# Patient Record
Sex: FEMALE | Race: WHITE | NOT HISPANIC OR LATINO | Employment: FULL TIME | URBAN - METROPOLITAN AREA
[De-identification: names, ages, dates, MRNs, and addresses within clinical notes are randomized per-mention and may not be internally consistent; named-entity substitution may affect disease eponyms.]

---

## 2017-04-17 ENCOUNTER — APPOINTMENT (OUTPATIENT)
Dept: WOMENS IMAGING | Facility: HOSPITAL | Age: 57
End: 2017-04-17

## 2017-04-17 PROCEDURE — 77067 SCR MAMMO BI INCL CAD: CPT | Performed by: RADIOLOGY

## 2018-04-18 ENCOUNTER — APPOINTMENT (OUTPATIENT)
Dept: WOMENS IMAGING | Facility: HOSPITAL | Age: 58
End: 2018-04-18

## 2018-04-18 PROCEDURE — 77067 SCR MAMMO BI INCL CAD: CPT | Performed by: RADIOLOGY

## 2019-04-18 ENCOUNTER — APPOINTMENT (OUTPATIENT)
Dept: WOMENS IMAGING | Facility: HOSPITAL | Age: 59
End: 2019-04-18

## 2019-04-18 PROCEDURE — 77067 SCR MAMMO BI INCL CAD: CPT | Performed by: RADIOLOGY

## 2020-10-27 ENCOUNTER — APPOINTMENT (OUTPATIENT)
Dept: PREADMISSION TESTING | Facility: HOSPITAL | Age: 60
End: 2020-10-27

## 2020-10-27 LAB — SARS-COV-2 RNA RESP QL NAA+PROBE: NOT DETECTED

## 2020-10-27 PROCEDURE — C9803 HOPD COVID-19 SPEC COLLECT: HCPCS

## 2020-10-27 PROCEDURE — U0004 COV-19 TEST NON-CDC HGH THRU: HCPCS

## 2020-10-29 ENCOUNTER — LAB REQUISITION (OUTPATIENT)
Dept: LAB | Facility: HOSPITAL | Age: 60
End: 2020-10-29

## 2020-10-29 DIAGNOSIS — R22.30 LOCALIZED SWELLING, MASS AND LUMP, UNSPECIFIED UPPER LIMB: ICD-10-CM

## 2020-10-29 PROCEDURE — 88307 TISSUE EXAM BY PATHOLOGIST: CPT | Performed by: PLASTIC SURGERY

## 2020-10-30 LAB
CYTO UR: NORMAL
LAB AP CASE REPORT: NORMAL
LAB AP CLINICAL INFORMATION: NORMAL
PATH REPORT.FINAL DX SPEC: NORMAL
PATH REPORT.GROSS SPEC: NORMAL

## 2021-04-16 ENCOUNTER — APPOINTMENT (OUTPATIENT)
Dept: WOMENS IMAGING | Facility: HOSPITAL | Age: 61
End: 2021-04-16

## 2021-04-16 PROCEDURE — 77067 SCR MAMMO BI INCL CAD: CPT | Performed by: RADIOLOGY

## 2022-03-24 ENCOUNTER — TELEPHONE (OUTPATIENT)
Dept: FAMILY MEDICINE CLINIC | Facility: CLINIC | Age: 62
End: 2022-03-24

## 2022-03-29 DIAGNOSIS — Z00.00 HEALTHCARE MAINTENANCE: ICD-10-CM

## 2022-04-02 DIAGNOSIS — Z00.00 HEALTHCARE MAINTENANCE: ICD-10-CM

## 2022-04-08 ENCOUNTER — OFFICE VISIT (OUTPATIENT)
Dept: FAMILY MEDICINE CLINIC | Facility: CLINIC | Age: 62
End: 2022-04-08

## 2022-04-08 VITALS
HEART RATE: 82 BPM | DIASTOLIC BLOOD PRESSURE: 84 MMHG | HEIGHT: 63 IN | RESPIRATION RATE: 16 BRPM | SYSTOLIC BLOOD PRESSURE: 144 MMHG | TEMPERATURE: 98 F

## 2022-04-08 DIAGNOSIS — I10 BENIGN ESSENTIAL HYPERTENSION: Primary | ICD-10-CM

## 2022-04-08 PROCEDURE — 99203 OFFICE O/P NEW LOW 30 MIN: CPT | Performed by: NURSE PRACTITIONER

## 2022-04-08 RX ORDER — LORATADINE 10 MG/1
10 TABLET ORAL
COMMUNITY
End: 2023-03-31 | Stop reason: CLARIF

## 2022-04-08 RX ORDER — TRIAMTERENE AND HYDROCHLOROTHIAZIDE 75; 50 MG/1; MG/1
1 TABLET ORAL DAILY
Qty: 90 TABLET | Refills: 1 | Status: SHIPPED | OUTPATIENT
Start: 2022-04-08 | End: 2022-10-11 | Stop reason: SDUPTHER

## 2022-04-08 RX ORDER — TRIAMTERENE AND HYDROCHLOROTHIAZIDE 75; 50 MG/1; MG/1
TABLET ORAL
COMMUNITY
Start: 2022-01-24 | End: 2022-04-08 | Stop reason: SDUPTHER

## 2022-04-08 NOTE — PROGRESS NOTES
"Chief Complaint  Depression and Hypertension    Subjective          Seema Mazariegos presents to [facility]  History of Present Illness  She is doing well on her medications. She states that her b/p readings are normal at home.  She denies chest pain and sob.   Objective   Vital Signs:   /84 (BP Location: Left arm, Patient Position: Sitting, Cuff Size: Large Adult)   Pulse 82   Temp 98 °F (36.7 °C) (Infrared)   Resp 16   Ht 160 cm (63\")     BMI has not been calculated during today's encounter.       Physical Exam  Vitals and nursing note reviewed.   Constitutional:       Appearance: Normal appearance. She is obese.   HENT:      Head: Normocephalic and atraumatic.      Right Ear: Tympanic membrane and ear canal normal.      Left Ear: Tympanic membrane and ear canal normal.      Nose: Nose normal.      Mouth/Throat:      Mouth: Mucous membranes are dry.      Pharynx: Oropharynx is clear.   Eyes:      Extraocular Movements: Extraocular movements intact.      Conjunctiva/sclera: Conjunctivae normal.      Pupils: Pupils are equal, round, and reactive to light.   Cardiovascular:      Rate and Rhythm: Normal rate and regular rhythm.      Heart sounds: Normal heart sounds.   Pulmonary:      Effort: Pulmonary effort is normal.      Breath sounds: Normal breath sounds.   Abdominal:      General: Abdomen is flat. Bowel sounds are normal. There is no distension.      Palpations: Abdomen is soft.      Tenderness: There is no abdominal tenderness. There is no guarding or rebound.   Musculoskeletal:         General: Normal range of motion.      Cervical back: Normal range of motion and neck supple.   Skin:     General: Skin is warm and dry.      Capillary Refill: Capillary refill takes less than 2 seconds.   Neurological:      General: No focal deficit present.      Mental Status: She is alert and oriented to person, place, and time. Mental status is at baseline.   Psychiatric:         Mood " and Affect: Mood normal.         Behavior: Behavior normal.         Thought Content: Thought content normal.         Judgment: Judgment normal.        Result Review :       Data reviewed: Her latest bw looks good. We reviewed this today.          Assessment and Plan    Diagnoses and all orders for this visit:    1. Benign essential hypertension (Primary)  Assessment & Plan:  Hypertension is improving with treatment.  Continue current treatment regimen.  Blood pressure will be reassessed at the next regular appointment.        Follow Up   Return in about 6 months (around 10/8/2022) for Recheck.  Patient was given instructions and counseling regarding her condition or for health maintenance advice. Please see specific information pulled into the AVS if appropriate.

## 2022-09-06 ENCOUNTER — TELEPHONE (OUTPATIENT)
Dept: FAMILY MEDICINE CLINIC | Facility: CLINIC | Age: 62
End: 2022-09-06

## 2022-09-06 DIAGNOSIS — I10 BENIGN ESSENTIAL HYPERTENSION: Primary | ICD-10-CM

## 2022-09-06 DIAGNOSIS — Z00.00 HEALTHCARE MAINTENANCE: ICD-10-CM

## 2022-09-06 DIAGNOSIS — Z11.59 ENCOUNTER FOR HCV SCREENING TEST FOR LOW RISK PATIENT: ICD-10-CM

## 2022-10-11 DIAGNOSIS — I10 BENIGN ESSENTIAL HYPERTENSION: ICD-10-CM

## 2022-10-11 RX ORDER — TRIAMTERENE AND HYDROCHLOROTHIAZIDE 75; 50 MG/1; MG/1
1 TABLET ORAL DAILY
Qty: 30 TABLET | Refills: 0 | Status: SHIPPED | OUTPATIENT
Start: 2022-10-11 | End: 2022-10-17

## 2022-10-11 NOTE — TELEPHONE ENCOUNTER
Incoming Refill Request      Medication requested (name and dose): TRIAMTERENE-HYDROCHLOROTHIAZIDE 75-50 MG     Pharmacy where request should be sent: *[facility]     Additional details provided by patient: Patient is going to run out B/P med before her 10/21. She had an appt on 10/14 but was rescheduled due to provider being unavaliable.     Best call back number: [phone]    Does the patient have less than a 3 day supply:  [] Yes  [x] No    Ney Stern Rep  10/11/22, 14:05 EDT

## 2022-10-11 NOTE — TELEPHONE ENCOUNTER
Rx Refill Note    Requested Prescriptions     Pending Prescriptions Disp Refills   • triamterene-hydrochlorothiazide (MAXZIDE) 75-50 MG per tablet 30 tablet 0     Sig: Take 1 tablet by mouth Daily.        Last office visit with prescribing clinician: 4/8/2022      Next office visit with prescribing clinician: 10/21/2022   Last labs:   Last refill: 04/08/2022   Pharmacy (be sure to add in Epic). correct

## 2022-10-15 DIAGNOSIS — I10 BENIGN ESSENTIAL HYPERTENSION: ICD-10-CM

## 2022-10-17 RX ORDER — TRIAMTERENE AND HYDROCHLOROTHIAZIDE 75; 50 MG/1; MG/1
TABLET ORAL
Qty: 30 TABLET | Refills: 0 | Status: SHIPPED | OUTPATIENT
Start: 2022-10-17 | End: 2022-10-21 | Stop reason: SDUPTHER

## 2022-10-17 NOTE — TELEPHONE ENCOUNTER
Rx Refill Note    Requested Prescriptions     Pending Prescriptions Disp Refills   • triamterene-hydrochlorothiazide (MAXZIDE) 75-50 MG per tablet [Pharmacy Med Name: TRIAMTERENE-HCTZ 75-50 MG TAB] 30 tablet 0     Sig: TAKE ONE TABLET BY MOUTH DAILY        Last office visit with prescribing clinician: 4/8/2022      Next office visit with prescribing clinician: 10/21/2022   Last labs:   Last refill: 10/11/2022   Pharmacy (be sure to add in Epic). correct

## 2022-10-21 ENCOUNTER — OFFICE VISIT (OUTPATIENT)
Dept: FAMILY MEDICINE CLINIC | Facility: CLINIC | Age: 62
End: 2022-10-21

## 2022-10-21 VITALS
BODY MASS INDEX: 45.58 KG/M2 | OXYGEN SATURATION: 98 % | SYSTOLIC BLOOD PRESSURE: 132 MMHG | DIASTOLIC BLOOD PRESSURE: 90 MMHG | HEART RATE: 88 BPM | WEIGHT: 247.7 LBS | HEIGHT: 62 IN

## 2022-10-21 DIAGNOSIS — F51.01 PRIMARY INSOMNIA: ICD-10-CM

## 2022-10-21 DIAGNOSIS — M19.90 ARTHRITIS: Primary | ICD-10-CM

## 2022-10-21 DIAGNOSIS — I10 BENIGN ESSENTIAL HYPERTENSION: ICD-10-CM

## 2022-10-21 PROCEDURE — 99214 OFFICE O/P EST MOD 30 MIN: CPT | Performed by: NURSE PRACTITIONER

## 2022-10-21 RX ORDER — TRIAMTERENE AND HYDROCHLOROTHIAZIDE 75; 50 MG/1; MG/1
1 TABLET ORAL DAILY
Qty: 90 TABLET | Refills: 1 | Status: SHIPPED | OUTPATIENT
Start: 2022-10-21 | End: 2023-03-31 | Stop reason: SDUPTHER

## 2022-10-21 RX ORDER — CEPHALEXIN 500 MG/1
CAPSULE ORAL
COMMUNITY
Start: 2022-09-22 | End: 2022-10-21

## 2022-10-21 RX ORDER — NIRMATRELVIR AND RITONAVIR 300-100 MG
KIT ORAL
COMMUNITY
Start: 2022-08-19 | End: 2022-10-21

## 2022-10-21 NOTE — ASSESSMENT & PLAN NOTE
I recommend she start with over-the-counter melatonin.  She can push the dose up to 9 mg on this.  She is to follow-up in a couple of months if this solution does not work for her.  Also encouraged her to read on relaxation techniques to help her get ready for bed at night.

## 2022-10-21 NOTE — PROGRESS NOTES
"Chief Complaint  Insomnia and Arthritis    Subjective        Seema Mazariegos presents to [facility]  History of Present Illness  She has a lot of pain in her feet and hands. She has a lot arthritis and has had bilateral knee and hip replacements. She has recently bw scanned into her chart.  She has not been tested for RA.  #2 insomnia she does not take anything over-the-counter for this.  She has suffered for this for a few years.  She is postmenopausal.    Objective   Vital Signs:  /90   Pulse 88   Ht 157.5 cm (62\")   Wt 112 kg (247 lb 11.2 oz)   SpO2 98%   BMI 45.30 kg/m²   Estimated body mass index is 45.3 kg/m² as calculated from the following:    Height as of this encounter: 157.5 cm (62\").    Weight as of this encounter: 112 kg (247 lb 11.2 oz).          Physical Exam  Vitals and nursing note reviewed.   Constitutional:       Appearance: Normal appearance.   HENT:      Head: Normocephalic and atraumatic.      Right Ear: Tympanic membrane and ear canal normal.      Left Ear: Tympanic membrane and ear canal normal.      Nose: Nose normal.      Mouth/Throat:      Pharynx: Oropharynx is clear.   Eyes:      Extraocular Movements: Extraocular movements intact.      Conjunctiva/sclera: Conjunctivae normal.      Pupils: Pupils are equal, round, and reactive to light.   Cardiovascular:      Rate and Rhythm: Normal rate and regular rhythm.      Heart sounds: Normal heart sounds.   Pulmonary:      Effort: Pulmonary effort is normal.      Breath sounds: Normal breath sounds.   Abdominal:      General: Abdomen is flat. Bowel sounds are normal. There is no distension.      Palpations: Abdomen is soft.      Tenderness: There is no abdominal tenderness. There is no guarding or rebound.   Musculoskeletal:         General: Normal range of motion.      Cervical back: Normal range of motion and neck supple.   Skin:     General: Skin is warm and dry.   Neurological:      General: No focal " deficit present.      Mental Status: She is alert and oriented to person, place, and time. Mental status is at baseline.   Psychiatric:         Mood and Affect: Mood normal.         Behavior: Behavior normal.         Thought Content: Thought content normal.         Judgment: Judgment normal.        Result Review :                Assessment and Plan   Diagnoses and all orders for this visit:    1. Arthritis (Primary)  Assessment & Plan:  Will do inflammatory markers and see her back.  She is to f/u with her orthopedic surgeon.       2. Benign essential hypertension  Assessment & Plan:  Hypertension is improving with treatment.  Continue current treatment regimen.  Blood pressure will be reassessed at the next regular appointment.    Orders:  -     triamterene-hydrochlorothiazide (MAXZIDE) 75-50 MG per tablet; Take 1 tablet by mouth Daily.  Dispense: 90 tablet; Refill: 1    3. Primary insomnia  Assessment & Plan:  I recommend she start with over-the-counter melatonin.  She can push the dose up to 9 mg on this.  She is to follow-up in a couple of months if this solution does not work for her.  Also encouraged her to read on relaxation techniques to help her get ready for bed at night.             Follow Up   Return in about 6 months (around 4/21/2023) for Recheck.  Patient was given instructions and counseling regarding her condition or for health maintenance advice. Please see specific information pulled into the AVS if appropriate.       Answers for HPI/ROS submitted by the patient on 10/20/2022  Please describe your symptoms.: 6-month meds check , Also want to discuss arthritis and sleep issues.  Have you had these symptoms before?: Yes  How long have you been having these symptoms?: Greater than 2 weeks  Please list any medications you are currently taking for this condition.: Triamterene HCTZ-50, Turmeric and CBD, Melatonin  What is the primary reason for your visit?: Other

## 2023-01-10 ENCOUNTER — TRANSCRIBE ORDERS (OUTPATIENT)
Dept: CT IMAGING | Facility: CLINIC | Age: 63
End: 2023-01-10
Payer: COMMERCIAL

## 2023-01-10 DIAGNOSIS — Z12.31 ENCOUNTER FOR MAMMOGRAM TO ESTABLISH BASELINE MAMMOGRAM: Primary | ICD-10-CM

## 2023-02-15 ENCOUNTER — PATIENT MESSAGE (OUTPATIENT)
Dept: FAMILY MEDICINE CLINIC | Facility: CLINIC | Age: 63
End: 2023-02-15
Payer: COMMERCIAL

## 2023-02-15 DIAGNOSIS — R92.8 ABNORMAL MAMMOGRAM OF RIGHT BREAST: Primary | ICD-10-CM

## 2023-02-23 ENCOUNTER — TELEPHONE (OUTPATIENT)
Dept: FAMILY MEDICINE CLINIC | Facility: CLINIC | Age: 63
End: 2023-02-23
Payer: COMMERCIAL

## 2023-02-23 NOTE — TELEPHONE ENCOUNTER
Called her today. She has not had an abnormal mammogram. Requested a diagnostic mammogram. We discussed her concerned.   jimmy

## 2023-03-23 DIAGNOSIS — R92.8 ABNORMAL MAMMOGRAM OF RIGHT BREAST: ICD-10-CM

## 2023-03-31 ENCOUNTER — OFFICE VISIT (OUTPATIENT)
Dept: FAMILY MEDICINE CLINIC | Facility: CLINIC | Age: 63
End: 2023-03-31
Payer: COMMERCIAL

## 2023-03-31 VITALS
OXYGEN SATURATION: 97 % | HEART RATE: 92 BPM | WEIGHT: 252.2 LBS | SYSTOLIC BLOOD PRESSURE: 164 MMHG | BODY MASS INDEX: 46.41 KG/M2 | HEIGHT: 62 IN | DIASTOLIC BLOOD PRESSURE: 88 MMHG | TEMPERATURE: 98.2 F

## 2023-03-31 DIAGNOSIS — I10 BENIGN ESSENTIAL HYPERTENSION: ICD-10-CM

## 2023-03-31 PROCEDURE — 99213 OFFICE O/P EST LOW 20 MIN: CPT | Performed by: NURSE PRACTITIONER

## 2023-03-31 RX ORDER — CETIRIZINE HYDROCHLORIDE 10 MG/1
TABLET ORAL
COMMUNITY
Start: 2023-03-20

## 2023-03-31 RX ORDER — TRIAMTERENE AND HYDROCHLOROTHIAZIDE 75; 50 MG/1; MG/1
1 TABLET ORAL DAILY
Qty: 90 TABLET | Refills: 1 | Status: SHIPPED | OUTPATIENT
Start: 2023-03-31

## 2023-03-31 NOTE — PROGRESS NOTES
"Chief Complaint  Med Refill    Subjective        Seema Mazariegos presents to [facility]  History of Present Illness    She is here for medication refills. She is stable on her medications. She had her blood work done at work. It is scanned in her chart. 3/9/23. No chest pain, tightness and no swelling of the lower extremities. She had a normal breast u/s after having an abnormal mammogram.   Objective   Vital Signs:  /88 (BP Location: Left arm, Patient Position: Sitting, Cuff Size: Large Adult)   Pulse 92   Temp 98.2 °F (36.8 °C) (Temporal)   Ht 157.5 cm (62\")   Wt 114 kg (252 lb 3.2 oz)   SpO2 97%   BMI 46.13 kg/m²   Estimated body mass index is 46.13 kg/m² as calculated from the following:    Height as of this encounter: 157.5 cm (62\").    Weight as of this encounter: 114 kg (252 lb 3.2 oz).             Physical Exam  Vitals and nursing note reviewed.   Constitutional:       Appearance: Normal appearance.   HENT:      Head: Normocephalic and atraumatic.      Right Ear: Tympanic membrane and ear canal normal.      Left Ear: Tympanic membrane and ear canal normal.      Nose: Nose normal.      Mouth/Throat:      Pharynx: Oropharynx is clear.   Eyes:      Extraocular Movements: Extraocular movements intact.      Conjunctiva/sclera: Conjunctivae normal.      Pupils: Pupils are equal, round, and reactive to light.   Cardiovascular:      Rate and Rhythm: Normal rate and regular rhythm.      Heart sounds: Normal heart sounds.   Pulmonary:      Effort: Pulmonary effort is normal.      Breath sounds: Normal breath sounds.   Abdominal:      General: Abdomen is flat. Bowel sounds are normal. There is no distension.      Palpations: Abdomen is soft.      Tenderness: There is no abdominal tenderness. There is no guarding or rebound.   Musculoskeletal:         General: Normal range of motion.      Cervical back: Normal range of motion and neck supple.   Skin:     General: Skin is warm " and dry.   Neurological:      General: No focal deficit present.      Mental Status: She is alert and oriented to person, place, and time. Mental status is at baseline.   Psychiatric:         Mood and Affect: Mood normal.         Behavior: Behavior normal.         Thought Content: Thought content normal.         Judgment: Judgment normal.        Result Review :                   Assessment and Plan   Diagnoses and all orders for this visit:    1. Benign essential hypertension  Assessment & Plan:  Hypertension is improving with treatment.  Continue current treatment regimen.  Dietary sodium restriction.  Weight loss.  Regular aerobic exercise.  Blood pressure will be reassessed at the next regular appointment.    Orders:  -     triamterene-hydrochlorothiazide (MAXZIDE) 75-50 MG per tablet; Take 1 tablet by mouth Daily.  Dispense: 90 tablet; Refill: 1           Follow Up   Return in about 6 months (around 9/30/2023) for Recheck.  Patient was given instructions and counseling regarding her condition or for health maintenance advice. Please see specific information pulled into the AVS if appropriate.       Answers for HPI/ROS submitted by the patient on 3/24/2023  Please describe your symptoms.: None  Have you had these symptoms before?: Yes  How long have you been having these symptoms?: 1-4 days  Please describe any probable cause for these symptoms. : The “required” fields on this check in are absolutely unnecessary for my the purpose of my visit!  I provided the reason for my visit when I scheduled my appointment, a 6-month meds check.  What is the primary reason for your visit?: Other

## 2023-09-29 ENCOUNTER — OFFICE VISIT (OUTPATIENT)
Dept: FAMILY MEDICINE CLINIC | Facility: CLINIC | Age: 63
End: 2023-09-29
Payer: COMMERCIAL

## 2023-09-29 VITALS
BODY MASS INDEX: 49.04 KG/M2 | HEART RATE: 85 BPM | OXYGEN SATURATION: 97 % | WEIGHT: 266.5 LBS | HEIGHT: 62 IN | DIASTOLIC BLOOD PRESSURE: 102 MMHG | SYSTOLIC BLOOD PRESSURE: 148 MMHG | TEMPERATURE: 98.4 F

## 2023-09-29 DIAGNOSIS — E66.01 MORBID (SEVERE) OBESITY DUE TO EXCESS CALORIES: ICD-10-CM

## 2023-09-29 DIAGNOSIS — Z13.820 SCREENING FOR OSTEOPOROSIS: ICD-10-CM

## 2023-09-29 DIAGNOSIS — I10 BENIGN ESSENTIAL HYPERTENSION: Primary | ICD-10-CM

## 2023-09-29 RX ORDER — SEMAGLUTIDE 0.25 MG/.5ML
0.25 INJECTION, SOLUTION SUBCUTANEOUS WEEKLY
Qty: 6 ML | Refills: 3 | Status: SHIPPED | OUTPATIENT
Start: 2023-09-29

## 2023-09-29 RX ORDER — TRIAMTERENE AND HYDROCHLOROTHIAZIDE 75; 50 MG/1; MG/1
1 TABLET ORAL DAILY
Qty: 90 TABLET | Refills: 1 | Status: SHIPPED | OUTPATIENT
Start: 2023-09-29

## 2023-09-29 NOTE — ASSESSMENT & PLAN NOTE
Patient's (Body mass index is 48.74 kg/m².) indicates that they are morbidly/severely obese (BMI > 40 or > 35 with obesity - related health condition) with health conditions that include hypertension . Weight is newly identified. BMI  is above average; BMI management plan is completed. We discussed low calorie, low carb based diet program, portion control, increasing exercise, joining a fitness center or start home based exercise program, Weight Watchers or other Commercial based weight reduction program, and pharmacologic options including weygovy . Discussed benefits as well as side effects.

## 2023-09-29 NOTE — PROGRESS NOTES
"Chief Complaint  Pre-op Exam (L SCOTT revision)    Subjective        Seema Mazariegos presents to [facility]  History of Present Illness she is here today for a refill on her HTN.   #2 pre -op physical for total hip revision. She has recent blood work in her chart it is perfectly normal.   #3 Obesity, she is wanting to discuss weight loss drug.     Objective   Vital Signs:  BP (!) 148/102 (BP Location: Left arm, Patient Position: Sitting, Cuff Size: Large Adult)   Pulse 85   Temp 98.4 °F (36.9 °C) (Temporal)   Ht 157.5 cm (62\")   Wt 121 kg (266 lb 8 oz)   SpO2 97%   BMI 48.74 kg/m²   Estimated body mass index is 48.74 kg/m² as calculated from the following:    Height as of this encounter: 157.5 cm (62\").    Weight as of this encounter: 121 kg (266 lb 8 oz).               Physical Exam  Vitals and nursing note reviewed.   Constitutional:       Appearance: She is obese.   HENT:      Head: Normocephalic and atraumatic.      Right Ear: Tympanic membrane and ear canal normal.      Left Ear: Tympanic membrane and ear canal normal.      Nose: Nose normal.      Mouth/Throat:      Pharynx: Oropharynx is clear.   Eyes:      Extraocular Movements: Extraocular movements intact.      Conjunctiva/sclera: Conjunctivae normal.      Pupils: Pupils are equal, round, and reactive to light.   Cardiovascular:      Rate and Rhythm: Normal rate and regular rhythm.      Heart sounds: Normal heart sounds.   Pulmonary:      Effort: Pulmonary effort is normal.      Breath sounds: Normal breath sounds.   Abdominal:      General: Abdomen is flat. Bowel sounds are normal. There is no distension.      Palpations: Abdomen is soft.      Tenderness: There is no abdominal tenderness. There is no guarding or rebound.   Musculoskeletal:         General: Normal range of motion.      Cervical back: Normal range of motion and neck supple.   Skin:     General: Skin is warm and dry.   Neurological:      General: No focal " deficit present.      Mental Status: She is alert and oriented to person, place, and time. Mental status is at baseline.   Psychiatric:         Mood and Affect: Mood normal.         Behavior: Behavior normal.         Thought Content: Thought content normal.         Judgment: Judgment normal.      Result Review :      Data reviewed : reviewed recent blood work             Assessment and Plan   Diagnoses and all orders for this visit:    1. Benign essential hypertension (Primary)  Assessment & Plan:  Hypertension is improving with treatment.  Continue current treatment regimen.  Dietary sodium restriction.  Weight loss.  Regular aerobic exercise.  Blood pressure will be reassessed at the next regular appointment.    Orders:  -     triamterene-hydrochlorothiazide (MAXZIDE) 75-50 MG per tablet; Take 1 tablet by mouth Daily.  Dispense: 90 tablet; Refill: 1    2. Morbid (severe) obesity due to excess calories  Assessment & Plan:  Patient's (Body mass index is 48.74 kg/m².) indicates that they are morbidly/severely obese (BMI > 40 or > 35 with obesity - related health condition) with health conditions that include hypertension . Weight is newly identified. BMI  is above average; BMI management plan is completed. We discussed low calorie, low carb based diet program, portion control, increasing exercise, joining a fitness center or start home based exercise program, Weight Watchers or other Commercial based weight reduction program, and pharmacologic options including weygovy . Discussed benefits as well as side effects.     Orders:  -     Semaglutide-Weight Management (Wegovy) 0.25 MG/0.5ML solution auto-injector; Inject 0.25 mg under the skin into the appropriate area as directed 1 (One) Time Per Week.  Dispense: 6 mL; Refill: 3    3. Screening for osteoporosis  Assessment & Plan:  Dexa scan ordered.    Orders:  -     DEXA Bone Density Axial             Follow Up   Return in about 6 months (around 3/29/2024) for  Recheck.  Patient was given instructions and counseling regarding her condition or for health maintenance advice. Please see specific information pulled into the AVS if appropriate.

## 2023-12-05 ENCOUNTER — TRANSCRIBE ORDERS (OUTPATIENT)
Dept: HOME HEALTH SERVICES | Facility: HOME HEALTHCARE | Age: 63
End: 2023-12-05
Payer: COMMERCIAL

## 2023-12-05 ENCOUNTER — HOME HEALTH ADMISSION (OUTPATIENT)
Dept: HOME HEALTH SERVICES | Facility: HOME HEALTHCARE | Age: 63
End: 2023-12-05
Payer: COMMERCIAL

## 2023-12-05 DIAGNOSIS — T84.52XA INFECTION ASSOCIATED WITH INTERNAL LEFT HIP PROSTHESIS, INITIAL ENCOUNTER: Primary | ICD-10-CM

## 2024-03-08 ENCOUNTER — TRANSCRIBE ORDERS (OUTPATIENT)
Dept: MAMMOGRAPHY | Facility: CLINIC | Age: 64
End: 2024-03-08
Payer: COMMERCIAL

## 2024-03-08 DIAGNOSIS — Z12.31 ENCOUNTER FOR SCREENING MAMMOGRAM FOR MALIGNANT NEOPLASM OF BREAST: Primary | ICD-10-CM

## 2024-03-19 DIAGNOSIS — Z13.1 SCREENING FOR DIABETES MELLITUS: ICD-10-CM

## 2024-03-19 DIAGNOSIS — Z13.220 SCREENING FOR HYPERLIPIDEMIA: ICD-10-CM

## 2024-03-19 DIAGNOSIS — Z00.00 GENERAL MEDICAL EXAM: Primary | ICD-10-CM

## 2024-03-19 DIAGNOSIS — Z11.59 NEED FOR HEPATITIS C SCREENING TEST: ICD-10-CM

## 2024-04-12 ENCOUNTER — OFFICE VISIT (OUTPATIENT)
Dept: FAMILY MEDICINE CLINIC | Facility: CLINIC | Age: 64
End: 2024-04-12
Payer: COMMERCIAL

## 2024-04-12 VITALS
OXYGEN SATURATION: 98 % | WEIGHT: 265.2 LBS | HEIGHT: 62 IN | DIASTOLIC BLOOD PRESSURE: 90 MMHG | SYSTOLIC BLOOD PRESSURE: 170 MMHG | BODY MASS INDEX: 48.8 KG/M2 | RESPIRATION RATE: 18 BRPM | HEART RATE: 106 BPM

## 2024-04-12 DIAGNOSIS — E87.6 HYPOKALEMIA: ICD-10-CM

## 2024-04-12 DIAGNOSIS — I10 BENIGN ESSENTIAL HYPERTENSION: ICD-10-CM

## 2024-04-12 DIAGNOSIS — I10 PRIMARY HYPERTENSION: Primary | ICD-10-CM

## 2024-04-12 DIAGNOSIS — R73.03 PREDIABETES: ICD-10-CM

## 2024-04-12 RX ORDER — TRIAMTERENE AND HYDROCHLOROTHIAZIDE 75; 50 MG/1; MG/1
1 TABLET ORAL DAILY
Qty: 90 TABLET | Refills: 0 | Status: SHIPPED | OUTPATIENT
Start: 2024-04-12

## 2024-04-12 RX ORDER — LOSARTAN POTASSIUM 50 MG/1
50 TABLET ORAL DAILY
Qty: 90 TABLET | Refills: 0 | Status: SHIPPED | OUTPATIENT
Start: 2024-04-12

## 2024-04-12 NOTE — ASSESSMENT & PLAN NOTE
Blood pressure is not well-controlled, add losartan and follow-up in 10 weeks.  She has her labs done through a clinic at work through [employer].  Written order given to recheck a BMP and magnesium level in 2 to 3 weeks.  If potassium not normalized with adding losartan, she will need potassium supplement.   28-Feb-2024

## 2024-04-12 NOTE — PROGRESS NOTES
Patient Office Visit      Patient Name: Seema Mazariegos  : 1960   MRN: [id number]     Chief Complaint:    Chief Complaint   Patient presents with    Hypertension    low potassium       History of Present Illness: Seema Mazariegos is a 63 y.o. female who is here today to establish care with a new provider in the office.  She needs refills of her blood pressure medication.  Patient had a revision of hip replacement and ended up with a staph infection.  She ended up back in the hospital with another surgical procedure and prolonged course of antibiotics for the infection.  She is confused as to why her potassium has been running low the past couple of times checked as this has never been an issue in the past.  She denies any diarrhea or excessive fluid intake.  She has been on an antibiotic but no other change in medication.    Subjective      Review of Systems:         Past Medical History:   Past Medical History:   Diagnosis Date    Benign essential hypertension     Depressive disorder     Hip osteoarthritis     RIGHT - HAD SOME NERVER DAMAGE BEING TREATED WITH GABAPENTIN       Past Surgical History:   Past Surgical History:   Procedure Laterality Date     SECTION      HYSTERECTOMY      JOINT REPLACEMENT Bilateral     Knees    TONSILLECTOMY      TOTAL HIP ARTHROPLASTY Bilateral     POSTERIOR APPROACH/ Revision       Family History:   Family History   Family history unknown: Yes       Social History:   Social History     Socioeconomic History    Marital status:      Spouse name: Asa   Tobacco Use    Smoking status: Never     Passive exposure: Never    Smokeless tobacco: Never   Vaping Use    Vaping status: Never Used   Substance and Sexual Activity    Alcohol use: Not Currently    Drug use: Never    Sexual activity: Yes     Partners: Male       Allergies:   Allergies   Allergen Reactions    Penicillins Rash    Sulfa Antibiotics Rash       Objective     Physical Exam:  Vital Signs:   Vitals:  "   04/12/24 1300   BP: 170/90   BP Location: Left arm   Patient Position: Sitting   Cuff Size: Large Adult   Pulse: 106   Resp: 18   SpO2: 98%   Weight: 120 kg (265 lb 3.2 oz)   Height: 157.5 cm (62\")     Body mass index is 48.51 kg/m².           Physical Exam  Constitutional:       Appearance: Normal appearance. She is obese.   Cardiovascular:      Rate and Rhythm: Normal rate and regular rhythm.   Pulmonary:      Effort: Pulmonary effort is normal.      Breath sounds: Normal breath sounds.   Musculoskeletal:      Cervical back: Normal range of motion and neck supple.   Neurological:      Mental Status: She is alert and oriented to person, place, and time.   Psychiatric:         Mood and Affect: Mood normal.         Behavior: Behavior normal.         Thought Content: Thought content normal.         Judgment: Judgment normal.         Procedures    Assessment / Plan      Assessment/Plan:   Diagnoses and all orders for this visit:    1. Primary hypertension (Primary)  Assessment & Plan:  Blood pressure is not well-controlled, add losartan and follow-up in 10 weeks.  She has her labs done through a clinic at work through [email or web address] [facility].  Written order given to recheck a BMP and magnesium level in 2 to 3 weeks.  If potassium not normalized with adding losartan, she will need potassium supplement.    Orders:  -     losartan (COZAAR) 50 MG tablet; Take 1 tablet by mouth Daily.  Dispense: 90 tablet; Refill: 0  -     triamterene-hydrochlorothiazide (MAXZIDE) 75-50 MG per tablet; Take 1 tablet by mouth Daily.  Dispense: 90 tablet; Refill: 0    2. Benign essential hypertension  Assessment & Plan:  Blood pressure is not well-controlled, add losartan and follow-up in 10 weeks.  She has her labs done through a clinic at work through [employer].  Written order given to recheck a BMP and magnesium level in 2 to 3 weeks.  If potassium not normalized with adding losartan, she will need potassium supplement.      3. " Prediabetes  Assessment & Plan:  Will check an A1c when she has her labs done in 2 to 3 weeks.    Orders:  -     Hemoglobin A1c; Future    4. Hypokalemia  Assessment & Plan:  Repeat BMP in 2 to 3 weeks, add supplement if potassium still low after adding losartan.    Orders:  -     Basic Metabolic Panel; Future  -     Magnesium; Future           Medications:     Current Outpatient Medications:     cetirizine (zyrTEC) 10 MG tablet, , Disp: , Rfl:     triamterene-hydrochlorothiazide (MAXZIDE) 75-50 MG per tablet, Take 1 tablet by mouth Daily., Disp: 90 tablet, Rfl: 0    losartan (COZAAR) 50 MG tablet, Take 1 tablet by mouth Daily., Disp: 90 tablet, Rfl: 0    I spent 55 minutes caring for Seema on this date of service. This time includes time spent by me in the following activities:preparing for the visit, reviewing tests, obtaining and/or reviewing a separately obtained history, performing a medically appropriate examination and/or evaluation , counseling and educating the patient/family/caregiver, ordering medications, tests, or procedures, and documenting information in the medical record    Follow Up:   Return in about 2 months (around 6/12/2024) for 30 minute med recheck.    Yulissa Hawley PA-C   [facility]     NOTE TO PATIENT: The 21st Century Cures Act makes medical notes like these available to patients in the interest of transparency. However, be advised this is a medical document. It is intended as peer to peer communication. It is written in medical language and may contain abbreviations or verbiage that are unfamiliar. It may appear blunt or direct. Medical documents are intended to carry relevant information, facts as evident, and the clinical opinion of the practitioner.

## 2024-05-09 DIAGNOSIS — I10 PRIMARY HYPERTENSION: ICD-10-CM

## 2024-05-09 RX ORDER — TRIAMTERENE AND HYDROCHLOROTHIAZIDE 75; 50 MG/1; MG/1
1 TABLET ORAL DAILY
Qty: 90 TABLET | Refills: 0 | OUTPATIENT
Start: 2024-05-09

## 2024-06-21 ENCOUNTER — OFFICE VISIT (OUTPATIENT)
Dept: FAMILY MEDICINE CLINIC | Facility: CLINIC | Age: 64
End: 2024-06-21
Payer: COMMERCIAL

## 2024-06-21 VITALS
SYSTOLIC BLOOD PRESSURE: 136 MMHG | BODY MASS INDEX: 48.76 KG/M2 | WEIGHT: 265 LBS | RESPIRATION RATE: 15 BRPM | DIASTOLIC BLOOD PRESSURE: 82 MMHG | HEART RATE: 73 BPM | OXYGEN SATURATION: 98 % | HEIGHT: 62 IN

## 2024-06-21 DIAGNOSIS — Z13.220 SCREENING FOR HYPERLIPIDEMIA: ICD-10-CM

## 2024-06-21 DIAGNOSIS — T84.52XS INFECTION ASSOCIATED WITH INTERNAL LEFT HIP PROSTHESIS, SEQUELA: ICD-10-CM

## 2024-06-21 DIAGNOSIS — E87.6 HYPOKALEMIA: ICD-10-CM

## 2024-06-21 DIAGNOSIS — Z00.00 GENERAL MEDICAL EXAM: ICD-10-CM

## 2024-06-21 DIAGNOSIS — I10 PRIMARY HYPERTENSION: Primary | ICD-10-CM

## 2024-06-21 DIAGNOSIS — R73.03 PREDIABETES: ICD-10-CM

## 2024-06-21 PROBLEM — T84.52XA: Status: ACTIVE | Noted: 2024-06-21

## 2024-06-21 PROCEDURE — 99215 OFFICE O/P EST HI 40 MIN: CPT | Performed by: PHYSICIAN ASSISTANT

## 2024-06-21 RX ORDER — CEPHALEXIN 500 MG/1
500 CAPSULE ORAL 2 TIMES DAILY
COMMUNITY

## 2024-06-21 RX ORDER — LOSARTAN POTASSIUM 50 MG/1
50 TABLET ORAL DAILY
Qty: 90 TABLET | Refills: 1 | Status: SHIPPED | OUTPATIENT
Start: 2024-06-21

## 2024-06-21 RX ORDER — TRIAMTERENE AND HYDROCHLOROTHIAZIDE 75; 50 MG/1; MG/1
1 TABLET ORAL DAILY
Qty: 90 TABLET | Refills: 1 | Status: SHIPPED | OUTPATIENT
Start: 2024-06-21

## 2024-06-21 NOTE — ASSESSMENT & PLAN NOTE
Previous potassium was 3.2.  We changed her hydrochlorothiazide to triamterene hydrochlorothiazide.  Potassium up to 3.6.  Will continue to monitor, blood pressure looks good.

## 2024-06-21 NOTE — ASSESSMENT & PLAN NOTE
Patient may be on Keflex the rest of her life.  Infectious disease doctor is following inflammatory markers and patient is requesting CRP and sed rate to be done with her routine blood work every 6 months.  Patient is able to get her lab work done at [facility] through her employer at no cost to her.

## 2024-06-21 NOTE — PROGRESS NOTES
"      Patient Office Visit      Patient Name: Seema Mazariegos  : 1960   MRN: [id number]     Chief Complaint:    Chief Complaint   Patient presents with    Hypertension       History of Present Illness: Seema Mazariegos is a 64 y.o. female who is here today for follow-up on her hypertension and low potassium.    Subjective      Review of Systems:         Past Medical History:   Past Medical History:   Diagnosis Date    Allergic 1968    Benign essential hypertension     Depressive disorder     Hip osteoarthritis     RIGHT - HAD SOME NERVER DAMAGE BEING TREATED WITH GABAPENTIN       Past Surgical History:   Past Surgical History:   Procedure Laterality Date     SECTION      COLONOSCOPY      HYSTERECTOMY      JOINT REPLACEMENT Bilateral     Knees    SUBTOTAL HYSTERECTOMY  2004    TONSILLECTOMY      TOTAL HIP ARTHROPLASTY Bilateral     POSTERIOR APPROACH/ Revision       Family History:   Family History   Problem Relation Age of Onset    Arthritis Mother     Diabetes Mother     Hyperlipidemia Mother     Vision loss Mother        Social History:   Social History     Socioeconomic History    Marital status:      Spouse name: Asa   Tobacco Use    Smoking status: Never     Passive exposure: Never    Smokeless tobacco: Never   Vaping Use    Vaping status: Never Used   Substance and Sexual Activity    Alcohol use: Not Currently    Drug use: Never    Sexual activity: Yes     Partners: Male     Birth control/protection: Vasectomy, Hysterectomy       Allergies:   Allergies   Allergen Reactions    Penicillins Rash    Sulfa Antibiotics Rash       Objective     Physical Exam:  Vital Signs:   Vitals:    24 1355   BP: 136/82   BP Location: Right arm   Patient Position: Sitting   Cuff Size: Adult   Pulse: 73   Resp: 15   SpO2: 98%   Weight: 120 kg (265 lb)   Height: 157.5 cm (62\")     Body mass index is 48.47 kg/m².           Physical Exam  Constitutional:       General: She is not in acute distress.     " Appearance: Normal appearance.   Neurological:      Mental Status: She is alert.   Psychiatric:         Mood and Affect: Mood normal.         Behavior: Behavior normal.         Thought Content: Thought content normal.         Judgment: Judgment normal.         Procedures    Assessment / Plan      Assessment/Plan:   Diagnoses and all orders for this visit:    1. Primary hypertension (Primary)  Assessment & Plan:  Previous potassium was 3.2.  We changed her hydrochlorothiazide to triamterene hydrochlorothiazide.  Potassium up to 3.6.  Will continue to monitor, blood pressure looks good.    Orders:  -     losartan (COZAAR) 50 MG tablet; Take 1 tablet by mouth Daily.  Dispense: 90 tablet; Refill: 1  -     triamterene-hydrochlorothiazide (MAXZIDE) 75-50 MG per tablet; Take 1 tablet by mouth Daily.  Dispense: 90 tablet; Refill: 1    2. 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  Infectious disease doctor is following inflammatory markers and patient is requesting CRP and sed rate to be done with her routine blood work every 6 months.  Patient is able to get her lab work done at [facility] through her employer at no cost to her.    Orders:  -     CK; Future  -     C-reactive protein; Future  -     Sedimentation rate, automated; Future    3. Prediabetes  Assessment & Plan:  A1c still in the prediabetes range at 5.8.  We will continue to monitor.    Orders:  -     Hemoglobin A1c; Future    4. Screening for hyperlipidemia  -     Lipid Panel; Future    5. General medical exam  -     Comprehensive Metabolic Panel; Future  -     Vitamin B12; Future  -     Folate; Future  -     TSH; Future  -     T4, Free; Future  -     CBC Auto Differential; Future  -     Vitamin D,25-Hydroxy; Future    6. Hypokalemia  Assessment & Plan:  Stable, continue with potassium sparing diuretic and continue to monitor.    Orders:  -     Comprehensive Metabolic Panel;  Future           Medications:     Current Outpatient Medications:     cetirizine (zyrTEC) 10 MG tablet, , Disp: , Rfl:     losartan (COZAAR) 50 MG tablet, Take 1 tablet by mouth Daily., Disp: 90 tablet, Rfl: 1    triamterene-hydrochlorothiazide (MAXZIDE) 75-50 MG per tablet, Take 1 tablet by mouth Daily., Disp: 90 tablet, Rfl: 1    cephalexin (KEFLEX) 500 MG capsule, Take 1 capsule by mouth 2 (Two) Times a Day. Indications: Bone and/or Joint Infection, Disp: , Rfl:     I spent 40 minutes caring for Seema on this date of service. This time includes time spent by me in the following activities:preparing for the visit, reviewing tests, obtaining and/or reviewing a separately obtained history, performing a medically appropriate examination and/or evaluation , counseling and educating the patient/family/caregiver, ordering medications, tests, or procedures, and documenting information in the medical record    Follow Up:   Return in about 4 months (around 10/21/2024) for Annual physical.    Yulissa Hawley PA-C   [facility]     NOTE TO PATIENT: The 21st Century Cures Act makes medical notes like these available to patients in the interest of transparency. However, be advised this is a medical document. It is intended as peer to peer communication. It is written in medical language and may contain abbreviations or verbiage that are unfamiliar. It may appear blunt or direct. Medical documents are intended to carry relevant information, facts as evident, and the clinical opinion of the practitioner.       Answers submitted by the patient for this visit:  Other (Submitted on 6/14/2024)  Please describe your symptoms.: Follow up requested by Yulissa Hawley after she added a new BP medication in April.  Have you had these symptoms before?: No  How long have you been having these symptoms?: 1-4 days  Please list any medications you are currently taking for this condition.: See prescribed BP  medication.  Primary Reason for Visit (Submitted on 6/14/2024)  What is the primary reason for your visit?: Other

## 2024-07-05 DIAGNOSIS — I10 PRIMARY HYPERTENSION: ICD-10-CM

## 2024-07-05 RX ORDER — LOSARTAN POTASSIUM 50 MG/1
50 TABLET ORAL DAILY
Qty: 90 TABLET | Refills: 1 | Status: SHIPPED | OUTPATIENT
Start: 2024-07-05

## 2024-10-04 ENCOUNTER — OFFICE VISIT (OUTPATIENT)
Dept: FAMILY MEDICINE CLINIC | Facility: CLINIC | Age: 64
End: 2024-10-04
Payer: COMMERCIAL

## 2024-10-04 VITALS
SYSTOLIC BLOOD PRESSURE: 132 MMHG | OXYGEN SATURATION: 98 % | DIASTOLIC BLOOD PRESSURE: 80 MMHG | BODY MASS INDEX: 47.48 KG/M2 | RESPIRATION RATE: 15 BRPM | WEIGHT: 258 LBS | HEART RATE: 79 BPM | HEIGHT: 62 IN

## 2024-10-04 DIAGNOSIS — E66.01 CLASS 3 SEVERE OBESITY DUE TO EXCESS CALORIES WITH SERIOUS COMORBIDITY AND BODY MASS INDEX (BMI) OF 45.0 TO 49.9 IN ADULT: ICD-10-CM

## 2024-10-04 DIAGNOSIS — Z12.4 SCREENING FOR CERVICAL CANCER: ICD-10-CM

## 2024-10-04 DIAGNOSIS — I10 PRIMARY HYPERTENSION: ICD-10-CM

## 2024-10-04 DIAGNOSIS — Z00.00 GENERAL MEDICAL EXAM: Primary | ICD-10-CM

## 2024-10-04 DIAGNOSIS — E87.6 HYPOKALEMIA: ICD-10-CM

## 2024-10-04 DIAGNOSIS — R73.03 PREDIABETES: ICD-10-CM

## 2024-10-04 DIAGNOSIS — E66.813 CLASS 3 SEVERE OBESITY DUE TO EXCESS CALORIES WITH SERIOUS COMORBIDITY AND BODY MASS INDEX (BMI) OF 45.0 TO 49.9 IN ADULT: ICD-10-CM

## 2024-10-04 RX ORDER — TRIAMTERENE AND HYDROCHLOROTHIAZIDE 75; 50 MG/1; MG/1
1 TABLET ORAL DAILY
Qty: 90 TABLET | Refills: 3 | Status: SHIPPED | OUTPATIENT
Start: 2024-10-04

## 2024-10-04 RX ORDER — LOSARTAN POTASSIUM 50 MG/1
50 TABLET ORAL DAILY
Qty: 90 TABLET | Refills: 3 | Status: SHIPPED | OUTPATIENT
Start: 2024-10-04

## 2024-10-04 NOTE — PROGRESS NOTES
Annual Physical-Preventive Visit     Patient Name: Seema Mazariegos  : 1960   MRN: [id number]     Chief Complaint:    Chief Complaint   Patient presents with    Annual Exam    Hypertension    Prediabetes    Obesity       History of Present Illness: Seema Mazariegos is a 64 y.o. female who is here today for their annual health maintenance and physical.  Labs done recently through her employer at [employer].  Normal CBC, CMP, vitamin D, CK, TSH, free T4, sed rate, B12, folate.  Hemoglobin A1c mildly elevated at 5.7, C-reactive protein checked per patient request and was elevated at 25.5, total cholesterol 176, HDL 66, triglycerides 119, LDL 88.  eGFR 77, potassium 3.7.  Patient has history of infected right hip hardware, sees infectious disease and they follow with C-reactive protein.  Patient unsure of what her last C-reactive protein level was.  She admits to having a very difficult time losing weight and wondering if inflammation in her body is contributing to prediabetes and obesity.    Subjective      Review of Systems:   Review of Systems   Constitutional:  Negative for fatigue and fever.   HENT:  Negative for hearing loss.    Eyes:  Negative for visual disturbance.   Respiratory:  Negative for shortness of breath.    Cardiovascular:  Negative for chest pain, palpitations and leg swelling.   Gastrointestinal:  Negative for abdominal pain, blood in stool, constipation and diarrhea.   Genitourinary:  Negative for difficulty urinating.   Musculoskeletal:  Negative for arthralgias and myalgias.   Skin:  Negative for rash.   Allergic/Immunologic: Negative for immunocompromised state.   Psychiatric/Behavioral:  Negative for dysphoric mood. The patient is not nervous/anxious.         Past History:  Medical History: has a past medical history of Allergic (), Benign essential hypertension, Depressive disorder, and Hip osteoarthritis.   Surgical History: has a past surgical history that includes Total  hip arthroplasty (Bilateral); Hysterectomy; Tonsillectomy;  section; Joint replacement (Bilateral); Subtotal Hysterectomy (); and Colonoscopy ().   Family History: family history includes Arthritis in her mother; Diabetes in her mother; Hyperlipidemia in her mother; Vision loss in her mother.   Social History: reports that she has never smoked. She has never been exposed to tobacco smoke. She has never used smokeless tobacco. She reports that she does not currently use alcohol. She reports that she does not use drugs.    Health Maintenance   Topic Date Due    HEPATITIS C SCREENING  Never done    ANNUAL PHYSICAL  10/08/2022    COLORECTAL CANCER SCREENING  2025    PAP SMEAR  10/11/2024    BMI FOLLOWUP  10/04/2025    MAMMOGRAM  03/15/2026    TDAP/TD VACCINES (2 - Td or Tdap) 2027    COVID-19 Vaccine  Completed    INFLUENZA VACCINE  Completed    ZOSTER VACCINE  Completed    Pneumococcal Vaccine 0-64  Aged Out        Immunization History   Administered Date(s) Administered    ABRYSVO (RSV, 60+ or pregnant women 32-36 wks) 10/06/2023    COVID-19 (MODERNA) 1st,2nd,3rd Dose Monovalent 2022    COVID-19 (MODERNA) BIVALENT 12+YRS 2022    COVID-19 (MODERNA) Monovalent Original Booster 2022    COVID-19 (PFIZER) 12YRS+ (COMIRNATY) 10/06/2023    COVID-19 (PFIZER) Purple Cap Monovalent 2021, 2021, 10/14/2021    Fluzone  >6mos 10/03/2016    Fluzone (or Fluarix & Flulaval for VFC) >6mos 2019, 2020, 10/06/2021, 10/06/2022, 09/15/2023    Fluzone Quad >6mos (Multi-dose) 10/02/2024    Influenza Injectable Mdck Pf Quad 10/06/2022    Influenza, Unspecified 10/30/2008    Shingrix 2021, 2021    Tdap 2017    flucelvax quad pfs =>4 YRS 10/01/2018       Medications:     Current Outpatient Medications:     cetirizine (zyrTEC) 10 MG tablet, , Disp: , Rfl:     losartan (COZAAR) 50 MG tablet, Take 1 tablet by mouth Daily. Please keep on file until patient needs  "refill., Disp: 90 tablet, Rfl: 3    triamterene-hydrochlorothiazide (MAXZIDE) 75-50 MG per tablet, Take 1 tablet by mouth Daily. Please keep on file until patient needs refill., Disp: 90 tablet, Rfl: 3    Allergies:   Allergies   Allergen Reactions    Penicillins Rash    Sulfa Antibiotics Rash       Depression: PHQ-2 Depression Screening  Little interest or pleasure in doing things?     Feeling down, depressed, or hopeless?     PHQ-2 Total Score        Predictive Model Details   No score data available for Risk of Fall         Objective     Physical Exam:  Vital Signs:   Vitals:    10/04/24 1256   BP: 132/80   BP Location: Right arm   Patient Position: Sitting   Cuff Size: Adult   Pulse: 79   Resp: 15   SpO2: 98%   Weight: 117 kg (258 lb)   Height: 158 cm (62.2\")     Body mass index is 46.89 kg/m².        Physical Exam  Exam conducted with a chaperone present.   Constitutional:       General: She is not in acute distress.     Appearance: Normal appearance.   HENT:      Head: Normocephalic and atraumatic.      Right Ear: Tympanic membrane, ear canal and external ear normal.      Left Ear: Tympanic membrane, ear canal and external ear normal.      Nose: Nose normal.      Mouth/Throat:      Mouth: Mucous membranes are moist.      Pharynx: Oropharynx is clear.   Eyes:      Extraocular Movements: Extraocular movements intact.      Conjunctiva/sclera: Conjunctivae normal.      Pupils: Pupils are equal, round, and reactive to light.   Cardiovascular:      Rate and Rhythm: Normal rate and regular rhythm.   Pulmonary:      Effort: Pulmonary effort is normal.      Breath sounds: Normal breath sounds.   Chest:   Breasts:     Right: Normal.      Left: Normal.   Abdominal:      General: Bowel sounds are normal.      Palpations: Abdomen is soft.      Tenderness: There is no abdominal tenderness.   Genitourinary:     General: Normal vulva.      Vagina: Normal.      Cervix: Normal.      Uterus: Absent.       Adnexa: Right adnexa " normal and left adnexa normal.      Comments: Obesity limits accuracy of exam.  Musculoskeletal:      Cervical back: Normal range of motion and neck supple.   Lymphadenopathy:      Upper Body:      Right upper body: No supraclavicular, axillary or pectoral adenopathy.      Left upper body: No supraclavicular, axillary or pectoral adenopathy.   Skin:     General: Skin is warm and dry.   Neurological:      Mental Status: She is alert and oriented to person, place, and time. Mental status is at baseline.   Psychiatric:         Mood and Affect: Mood normal.         Behavior: Behavior normal.         Thought Content: Thought content normal.         Judgment: Judgment normal.         Procedures    Assessment / Plan      Assessment/Plan:   Diagnoses and all orders for this visit:    1. General medical exam (Primary)    2. Primary hypertension  Assessment & Plan:  Hypertension is stable and controlled  Continue current treatment regimen.  Blood pressure will be reassessed in 6 months.    Orders:  -     losartan (COZAAR) 50 MG tablet; Take 1 tablet by mouth Daily. Please keep on file until patient needs refill.  Dispense: 90 tablet; Refill: 3  -     triamterene-hydrochlorothiazide (MAXZIDE) 75-50 MG per tablet; Take 1 tablet by mouth Daily. Please keep on file until patient needs refill.  Dispense: 90 tablet; Refill: 3    3. Class 3 severe obesity due to excess calories with serious comorbidity and body mass index (BMI) of 45.0 to 49.9 in adult  Assessment & Plan:  Patient's (Body mass index is 46.89 kg/m².) indicates that they are morbidly/severely obese (BMI > 40 or > 35 with obesity - related health condition) with health conditions that include hypertension and impaired fasting glucose . Weight is improving with lifestyle modifications. BMI  is above average; BMI management plan is completed. We discussed portion control and increasing exercise.  She has lost a few pounds since last visit but coming off very slowly.   Will refer for medical weight loss.    Orders:  -     Ambulatory Referral to Weight Management Program    4. Prediabetes  Assessment & Plan:  Stable, A1c 5.7.  Will continue to monitor.      5. Hypokalemia  Assessment & Plan:  Stable, continue with potassium sparing diuretic and continue to monitor.      6. Screening for cervical cancer  -     IGP, Aptima HPV, Rfx 16 / 18,45             Current Outpatient Medications:     cetirizine (zyrTEC) 10 MG tablet, , Disp: , Rfl:     losartan (COZAAR) 50 MG tablet, Take 1 tablet by mouth Daily. Please keep on file until patient needs refill., Disp: 90 tablet, Rfl: 3    triamterene-hydrochlorothiazide (MAXZIDE) 75-50 MG per tablet, Take 1 tablet by mouth Daily. Please keep on file until patient needs refill., Disp: 90 tablet, Rfl: 3    Follow Up:   No follow-ups on file.    Healthcare Maintenance:   Counseling provided on healthy diet and exercise.  Seema Mazariegos voices understanding and acceptance of this advice.  AVS with preventive healthcare tips printed for patient.     Yulissa Hawley PA-C  [facility]      NOTE TO PATIENT: The 21st Century Cures Act makes medical notes like these available to patients in the interest of transparency. However, be advised this is a medical document. It is intended as peer to peer communication. It is written in medical language and may contain abbreviations or verbiage that are unfamiliar. It may appear blunt or direct. Medical documents are intended to carry relevant information, facts as evident, and the clinical opinion of the practitioner.           Answers submitted by the patient for this visit:  Other (Submitted on 10/1/2024)  Please describe your symptoms.: Appointment is for a general physical, which is not an available option on the previous screen. Also several of the below options are irrelevant!  Have you had these symptoms before?: No  How long have you been having these symptoms?: 1-4 days  Primary  Reason for Visit (Submitted on 10/1/2024)  What is the primary reason for your visit?: Problem Not Listed

## 2024-10-04 NOTE — ASSESSMENT & PLAN NOTE
Patient's (Body mass index is 46.89 kg/m².) indicates that they are morbidly/severely obese (BMI > 40 or > 35 with obesity - related health condition) with health conditions that include hypertension and impaired fasting glucose . Weight is improving with lifestyle modifications. BMI  is above average; BMI management plan is completed. We discussed portion control and increasing exercise.  She has lost a few pounds since last visit but coming off very slowly.  Will refer for medical weight loss.

## 2024-10-10 LAB
CYTOLOGIST CVX/VAG CYTO: NORMAL
CYTOLOGY CVX/VAG DOC CYTO: NORMAL
CYTOLOGY CVX/VAG DOC THIN PREP: NORMAL
DX ICD CODE: NORMAL
HPV GENOTYPE REFLEX: NORMAL
HPV I/H RISK 4 DNA CVX QL PROBE+SIG AMP: NEGATIVE
Lab: NORMAL
OTHER STN SPEC: NORMAL
STAT OF ADQ CVX/VAG CYTO-IMP: NORMAL

## 2025-02-13 ENCOUNTER — TRANSCRIBE ORDERS (OUTPATIENT)
Dept: CT IMAGING | Facility: CLINIC | Age: 65
End: 2025-02-13
Payer: COMMERCIAL

## 2025-02-13 DIAGNOSIS — Z12.31 ENCOUNTER FOR SCREENING MAMMOGRAM FOR MALIGNANT NEOPLASM OF BREAST: Primary | ICD-10-CM

## 2025-03-28 ENCOUNTER — OFFICE VISIT (OUTPATIENT)
Dept: FAMILY MEDICINE CLINIC | Facility: CLINIC | Age: 65
End: 2025-03-28
Payer: COMMERCIAL

## 2025-03-28 VITALS
HEIGHT: 62 IN | SYSTOLIC BLOOD PRESSURE: 138 MMHG | OXYGEN SATURATION: 100 % | WEIGHT: 248.8 LBS | HEART RATE: 80 BPM | DIASTOLIC BLOOD PRESSURE: 90 MMHG | BODY MASS INDEX: 45.78 KG/M2

## 2025-03-28 DIAGNOSIS — E66.813 CLASS 3 SEVERE OBESITY DUE TO EXCESS CALORIES WITH SERIOUS COMORBIDITY AND BODY MASS INDEX (BMI) OF 45.0 TO 49.9 IN ADULT: ICD-10-CM

## 2025-03-28 DIAGNOSIS — E66.01 CLASS 3 SEVERE OBESITY DUE TO EXCESS CALORIES WITH SERIOUS COMORBIDITY AND BODY MASS INDEX (BMI) OF 45.0 TO 49.9 IN ADULT: ICD-10-CM

## 2025-03-28 DIAGNOSIS — Z11.59 NEED FOR HEPATITIS C SCREENING TEST: ICD-10-CM

## 2025-03-28 DIAGNOSIS — I10 PRIMARY HYPERTENSION: Primary | ICD-10-CM

## 2025-03-28 DIAGNOSIS — Z13.220 SCREENING FOR HYPERLIPIDEMIA: ICD-10-CM

## 2025-03-28 DIAGNOSIS — G89.29 CHRONIC LEFT HIP PAIN: ICD-10-CM

## 2025-03-28 DIAGNOSIS — R73.03 PREDIABETES: ICD-10-CM

## 2025-03-28 DIAGNOSIS — Z00.00 GENERAL MEDICAL EXAM: ICD-10-CM

## 2025-03-28 DIAGNOSIS — M25.552 CHRONIC LEFT HIP PAIN: ICD-10-CM

## 2025-03-28 LAB
EXPIRATION DATE: NORMAL
HBA1C MFR BLD: 5.6 % (ref 4.5–5.7)
Lab: NORMAL

## 2025-03-28 RX ORDER — LOSARTAN POTASSIUM 100 MG/1
100 TABLET ORAL DAILY
Qty: 90 TABLET | Refills: 1 | Status: SHIPPED | OUTPATIENT
Start: 2025-03-28

## 2025-03-28 RX ORDER — TRIAMTERENE AND HYDROCHLOROTHIAZIDE 75; 50 MG/1; MG/1
1 TABLET ORAL DAILY
Qty: 90 TABLET | Refills: 1 | Status: SHIPPED | OUTPATIENT
Start: 2025-03-28

## 2025-03-28 NOTE — ASSESSMENT & PLAN NOTE
ADA reasonable accommodations paperwork completed.  Continue to follow with infectious disease and orthopedics.

## 2025-03-28 NOTE — PROGRESS NOTES
Patient Office Visit      Patient Name: Seema Mazariegos  : 1960   MRN: [id number]     Chief Complaint:    Chief Complaint   Patient presents with    Hypertension    Hip Pain    Obesity    Prediabetes       History of Present Illness: Seema Mazariegos is a 64 y.o. female who is here today to follow-up on high blood pressure.  She received the paperwork from weight management but could not get a straight answer from her insurance company as to whether they covered weight loss medication.  Patient says she cannot afford I have medication co-pay so she never returned the paperwork.  She has Americans with disabilities act reasonable accommodations paperwork asking to have limits put on have any hours she has to work in the office per week she is able to do her job from home.  They are mandating that she be in the office at least twice a week which she has been able to tolerate but not 2 days consecutive.  She has a history of left hip replacement with a revision about a year later with a complication of osteomyelitis and she cannot sit in 1 position for any prolonged period of time if she cannot stand for any prolonged period of time.  She has a recliner at home with a lap desk and she is able to perform her work, which is mostly on the computer, in more comfortable position.    Subjective      Review of Systems:   Review of Systems   Constitutional:  Negative for chills, diaphoresis, fatigue and fever.   HENT:  Negative for congestion and sore throat.    Respiratory:  Negative for cough.    Cardiovascular:  Negative for chest pain.   Gastrointestinal:  Negative for abdominal pain, nausea and vomiting.   Genitourinary:  Negative for dysuria.   Musculoskeletal:  Negative for myalgias and neck pain.   Skin:  Negative for rash.   Neurological:  Positive for weakness. Negative for numbness.        Past Medical History:   Past Medical History:   Diagnosis Date    1968    Benign essential hypertension   "   Depressive disorder     Hip osteoarthritis     RIGHT - HAD SOME NERVER DAMAGE BEING TREATED WITH GABAPENTIN       Past Surgical History:   Past Surgical History:   Procedure Laterality Date     SECTION      COLONOSCOPY  2014    HYSTERECTOMY      JOINT REPLACEMENT Bilateral     Knees    SUBTOTAL HYSTERECTOMY  2004    TONSILLECTOMY      TOTAL HIP ARTHROPLASTY Bilateral     POSTERIOR APPROACH/ Revision       Family History:   Family History   Problem Relation Age of Onset    Arthritis Mother     Diabetes Mother     Hyperlipidemia Mother     Vision loss Mother        Social History:   Social History     Socioeconomic History    Marital status:      Spouse name: Asa   Tobacco Use    Smoking status: Never     Passive exposure: Never    Smokeless tobacco: Never   Vaping Use    Vaping status: Never Used   Substance and Sexual Activity    Alcohol use: Not Currently    Drug use: Never    Sexual activity: Yes     Partners: Male     Birth control/protection: Vasectomy, Hysterectomy       Allergies:   Allergies   Allergen Reactions    Penicillins Rash    Sulfa Antibiotics Rash       Objective     Physical Exam:  Vital Signs:   Vitals:    25 1352 25 1444   BP: 144/82 138/90   BP Location: Left arm Left arm   Patient Position: Sitting Sitting   Cuff Size: Large Adult Large Adult   Pulse: 80    SpO2: 100%    Weight: 113 kg (248 lb 12.8 oz)    Height: 157.5 cm (62\")    PainSc: 0-No pain      Body mass index is 45.51 kg/m².           Physical Exam  Constitutional:       General: She is not in acute distress.     Appearance: Normal appearance. She is obese.   Cardiovascular:      Rate and Rhythm: Normal rate and regular rhythm.   Pulmonary:      Effort: Pulmonary effort is normal.      Breath sounds: Normal breath sounds.   Musculoskeletal:      Cervical back: Normal range of motion and neck supple.   Neurological:      Mental Status: She is alert and oriented to person, place, and time.   Psychiatric:    "      Mood and Affect: Mood normal.         Behavior: Behavior normal.         Thought Content: Thought content normal.         Judgment: Judgment normal.         Procedures    Assessment / Plan      Assessment/Plan:   Diagnoses and all orders for this visit:    1. Primary hypertension (Primary)  Assessment & Plan:  Hypertension is uncontrolled  Medication changes per orders.  Blood pressure will be reassessedin 6 months.    Orders:  -     triamterene-hydrochlorothiazide (MAXZIDE) 75-50 MG per tablet; Take 1 tablet by mouth Daily.  Dispense: 90 tablet; Refill: 1  -     losartan (COZAAR) 100 MG tablet; Take 1 tablet by mouth Daily.  Dispense: 90 tablet; Refill: 1    2. Prediabetes  Assessment & Plan:  A1c improving, continue to monitor.    Orders:  -     POC Glycosylated Hemoglobin (Hb A1C)    3. General medical exam  -     Comprehensive Metabolic Panel; Future  -     Vitamin B12; Future  -     Folate; Future  -     Lipid Panel; Future  -     Hemoglobin A1c; Future  -     TSH; Future  -     T4, Free; Future  -     CBC Auto Differential; Future  -     Vitamin D,25-Hydroxy; Future  -     CK; Future  -     HCV Antibody Rfx To Qnt PCR; Future    4. Screening for hyperlipidemia  -     Lipid Panel; Future    5. Need for hepatitis C screening test  -     HCV Antibody Rfx To Qnt PCR; Future    6. Class 3 severe obesity due to excess calories with serious comorbidity and body mass index (BMI) of 45.0 to 49.9 in adult  Assessment & Plan:  Patient's (Body mass index is 45.51 kg/m².) indicates that they are morbidly/severely obese (BMI > 40 or > 35 with obesity - related health condition) with health conditions that include hypertension and osteoarthritis . Weight is improving with lifestyle modifications. BMI  is above average; BMI management plan is completed. We discussed low calorie, low carb based diet program, portion control, and increasing exercise.  Very slow improvement with lifestyle changes.  She would be a good  candidate for injectable GLP-1.  I recommend she go ahead and return her paperwork to weight management and at least go for a consult.  Her state government insurance is one of the insurance plans that has been covering the injectable GLP-1 medications.      7. Chronic left hip pain  Assessment & Plan:  ADA reasonable accommodations paperwork completed.  Continue to follow with infectious disease and orthopedics.         Order given for labs to be done at an outside lab through patient's employer prior to next visit in 6 months where there is no cost to her.    Medications:     Current Outpatient Medications:     cetirizine (zyrTEC) 10 MG tablet, , Disp: , Rfl:     losartan (COZAAR) 100 MG tablet, Take 1 tablet by mouth Daily., Disp: 90 tablet, Rfl: 1    triamterene-hydrochlorothiazide (MAXZIDE) 75-50 MG per tablet, Take 1 tablet by mouth Daily., Disp: 90 tablet, Rfl: 1        Follow Up:   Return in 6 months (on 9/28/2025) for Annual physical.    Yulissa Hawley PA-C   [facility]     NOTE TO PATIENT: The 21st Century Cures Act makes medical notes like these available to patients in the interest of transparency. However, be advised this is a medical document. It is intended as peer to peer communication. It is written in medical language and may contain abbreviations or verbiage that are unfamiliar. It may appear blunt or direct. Medical documents are intended to carry relevant information, facts as evident, and the clinical opinion of the practitioner.   Answers submitted by the patient for this visit:  Problem not listed (Submitted on 3/21/2025)  Chief Complaint: Other medical problem  Reason for appointment: 6-month meds check and ADA form completion  anorexia: No  joint pain: Yes  change in stool: No  joint swelling: No  swollen glands: No  vertigo: No  visual change: No  Onset: 1 to 5 years  Chronicity: recurrent  Frequency: weekly  Additional information: Haven’t fully recovered strength  and romaine after October 2023 hip revision surgery and November 2023 infection.

## 2025-03-28 NOTE — ASSESSMENT & PLAN NOTE
Patient's (Body mass index is 45.51 kg/m².) indicates that they are morbidly/severely obese (BMI > 40 or > 35 with obesity - related health condition) with health conditions that include hypertension and osteoarthritis . Weight is improving with lifestyle modifications. BMI  is above average; BMI management plan is completed. We discussed low calorie, low carb based diet program, portion control, and increasing exercise.  Very slow improvement with lifestyle changes.  She would be a good candidate for injectable GLP-1.  I recommend she go ahead and return her paperwork to weight management and at least go for a consult.  Her state government insurance is one of the insurance plans that has been covering the injectable GLP-1 medications.

## 2025-03-28 NOTE — ASSESSMENT & PLAN NOTE
Hypertension is uncontrolled  Medication changes per orders.  Blood pressure will be reassessedin 6 months.